# Patient Record
Sex: MALE
[De-identification: names, ages, dates, MRNs, and addresses within clinical notes are randomized per-mention and may not be internally consistent; named-entity substitution may affect disease eponyms.]

---

## 2024-07-03 ENCOUNTER — CARE COORDINATION (OUTPATIENT)
Dept: OTHER | Facility: CLINIC | Age: 74
End: 2024-07-03

## 2024-07-03 NOTE — CARE COORDINATION
Ambulatory Care Coordination Note     7/3/2024 11:36 AM     Patient outreach attempt by this ACM today to offer care management services. ACM was unable to reach the patient by telephone today; left voice message requesting a return phone call to this ACM.     ACM: Javi Contreras RN    PCP/Specialist follow up:     Follow-up Information       Follow up With Specialties Details Why Contact Info    Delia Ramírez MD [facility]   [address] [place] [address] [address]  [place] [address]  [phone]              Follow Up:   Plan for next ACM outreach in approximately 1 week to complete:  - outreach attempt to offer care management services.      IZZY Ron RN  Ambulatory Care Manager  Phone: [phone]  Email: [email or web address]

## 2024-07-08 ENCOUNTER — CARE COORDINATION (OUTPATIENT)
Dept: OTHER | Facility: CLINIC | Age: 74
End: 2024-07-08

## 2024-07-08 NOTE — CARE COORDINATION
Care Transitions Note    Initial Call - Call within 2 business days of discharge: Yes    Attempted to reach patient for transitions of care follow up. Unable to reach patient.    Outreach Attempts:   Multiple attempts to contact patient at phone numbers on file.   HIPAA compliant voicemail left for patient.     Patient: Gabo Kapadia    Patient : 1950   MRN: [id number]    Reason for Admission: Systemic inflammatory response syndrome  Discharge Date: 24   RURS: No data recorded    Was this an external facility discharge? Yes. Discharge Date: 24. Facility Name: [facility]    Follow Up Appointment:   Patient has hospital follow up appointment scheduled greater than 14 days after discharge;  .    Future Appointments         Provider Specialty Dept Phone    8/15/2024 1:15 PM Delia Ramírez MD              Plan for follow-up on next business day.      Javi Contreras MSN RN  Ambulatory Care Manager  Phone: [phone]  Email: [email or web address]

## 2024-07-09 ENCOUNTER — CARE COORDINATION (OUTPATIENT)
Dept: OTHER | Facility: CLINIC | Age: 74
End: 2024-07-09

## 2024-07-09 NOTE — CARE COORDINATION
Care Transitions Note    Initial Call - Call within 2 business days of discharge: Yes    Attempted to reach patient for transitions of care follow up. Unable to reach patient.    Outreach Attempts:   Multiple attempts to contact patient at phone numbers on file.   HIPAA compliant voicemail left for patient.     Patient: Gabo Kapadia    Patient : 1950   MRN: [id number]    Reason for Admission: Systemic inflammatory response syndrome  Discharge Date: 24   RURS: No data recorded    Was this an external facility discharge? Yes. Discharge Date: 24. Facility Name: [facility]    Follow Up Appointment:   Patient has hospital follow up appointment scheduled greater than 14 days after discharge;  .    Future Appointments         Provider Specialty Dept Phone    8/15/2024 1:15 PM Delia Ramírez MD              Plan for follow-up call in 2-5 days     Javi Contreras MSN RN  Ambulatory Care Manager  Phone: [phone]  Email: [email or web address]

## 2024-07-15 ENCOUNTER — CARE COORDINATION (OUTPATIENT)
Dept: OTHER | Facility: CLINIC | Age: 74
End: 2024-07-15

## 2024-07-15 NOTE — CARE COORDINATION
Care Transitions Note    Initial Call - Call within 2 business days of discharge: Yes    Attempted to reach patient for transitions of care follow up. Unable to reach patient.    Outreach Attempts:   Multiple attempts to contact patient at phone numbers on file.   HIPAA compliant voicemail left for patient.     Patient: Gabo Kapadia    Patient : 1950   MRN: [id number]    Reason for Admission: Systemic inflammatory response syndrome   Discharge Date: 24     RURS: No data recorded    Was this an external facility discharge? Yes. Discharge Date: 24. Facility Name: [facility]    Follow Up Appointment:   Patient has hospital follow up appointment scheduled greater than 14 days after discharge; will address if patient returns call to ACM.    Future Appointments         Provider Specialty Dept Phone    8/15/2024 1:15 PM Delia Ramírez MD              No further follow-up call indicated     IZZY Ron RN  Ambulatory Care Manager  Phone: [phone]  Email: [email or web address]